# Patient Record
Sex: MALE | Race: ASIAN | NOT HISPANIC OR LATINO | URBAN - METROPOLITAN AREA
[De-identification: names, ages, dates, MRNs, and addresses within clinical notes are randomized per-mention and may not be internally consistent; named-entity substitution may affect disease eponyms.]

---

## 2020-07-17 ENCOUNTER — EMERGENCY (EMERGENCY)
Facility: HOSPITAL | Age: 65
LOS: 1 days | Discharge: HOME | End: 2020-07-17
Attending: EMERGENCY MEDICINE
Payer: MEDICAID

## 2020-07-17 VITALS
SYSTOLIC BLOOD PRESSURE: 118 MMHG | RESPIRATION RATE: 18 BRPM | HEART RATE: 70 BPM | TEMPERATURE: 99 F | DIASTOLIC BLOOD PRESSURE: 81 MMHG | OXYGEN SATURATION: 96 %

## 2020-07-17 PROCEDURE — 99285 EMERGENCY DEPT VISIT HI MDM: CPT

## 2020-07-17 PROCEDURE — 93010 ELECTROCARDIOGRAM REPORT: CPT

## 2020-07-17 NOTE — ED PROVIDER NOTE - NS ED ROS FT
CONSTITUTIONAL: UNSTEADINESS. No fevers, chills, fatigue, weakness  HEENT: No loss of vision, double vision, blurry vision, ear drainage, nasal congestion, runny nose, sore throat  CV: No chest pain, palpitations  PULM: No cough, shortness of breath  GI: NAUSEA. No abdominal pain, vomiting, bloody stools  : No dysuria, polyuria, hematuria  SKIN: No rashes, swelling  MSK: No muscle aches, joint aches  NEURO: no headache, paresthesias  PSYCHIATRIC: Denies suicidal, homicidal ideations. No auditory, visual, tactile hallucinations.

## 2020-07-17 NOTE — ED PROVIDER NOTE - PROGRESS NOTE DETAILS
Rj Fortune D.O., PGY2 (Resident)  Patient feels back at baseline. Able to ambulate without complication. Discussed case with neurology who will get back to me. attending Alexis: pt evaluated by neurology. Offered admission by neurology but patient and family wish to follow-up with their private neurologist as already scheduled. Gait at baseline as per daughter. Will dc with copy of results, instruction for close outpatient follow-up with neurology and strict return precautions.

## 2020-07-17 NOTE — ED PROVIDER NOTE - ATTENDING CONTRIBUTION TO CARE
attending Alexis: 64yM h/o gout, HTN, "mild CAD" presents with daughter from home for dizziness. As per daughter, pt with subacute dizziness x months with ongoing outpatient workup (pending MRI brain next week) and possible diagnosis of Parkinson disease. Today with episode of nausea and shaking of b/l hands, lasting 30 min with no LOC. +period of confusion following as per daughter, now back to baseline. No h/o seizures. Had difficulty walking today more than baseline. No falls. Neuro exam significant for tongue deviation to L and difficulty standing and walking independently. Will obtain FSG, ekg, place on tele, labs, CT imaging brain/neck, neuro eval in ED, reassess.

## 2020-07-17 NOTE — ED PROVIDER NOTE - CLINICAL SUMMARY MEDICAL DECISION MAKING FREE TEXT BOX
64 y.o. male here for acute on chronic dizziness ongoing for past 3 months, different today as there was an episode of shaking, self-limited as well as nausea. Vitals wnl. Exam with left tongue deviation but no other focal neuro deficits. Patient unable to ambulate. Became dizzy upon standing. Will eval for acute intracranial process vs seizure focus vs infectious etiology. Will get labs including trop, CTA head and neck, CTH noncontrast, and reassess. Will likely discuss case with neuro pending CTs.

## 2020-07-17 NOTE — ED PROVIDER NOTE - PATIENT PORTAL LINK FT
You can access the FollowMyHealth Patient Portal offered by [facility] by registering at the following website: [email or web address]. By joining [facility]’s FollowMyHealth portal, you will also be able to view your health information using other applications (apps) compatible with our system.

## 2020-07-17 NOTE — ED PROVIDER NOTE - NSFOLLOWUPINSTRUCTIONS_ED_ALL_ED_FT
Stay well hydrated.  Follow-up with your neurologist within 1-2 days  Bring a copy of your results with you  Return to an ER for worsening symptoms or any other concerns.

## 2020-07-17 NOTE — ED PROVIDER NOTE - PHYSICAL EXAMINATION
GENERAL: elderly male, lying in bed, NAD. Vital signs are within normal limits  HEENT: NC/AT, conjunctiva noninjected and sclera anicteric, moist mucous membranes,  NECK: Supple, trachea midline  LUNG: CTAB, no w/r/r appreciated  CV: RRR, no m/r/g appreciated, Pulses- Radial: 2+ b/l; posterior tibial: 2+ b/l; dorsalis pedis: 2+ b/l  ABDOMEN: Soft, NTND, no rebound or guarding  BACK: No midline spinal tenderness or step-offs. No paraspinal tenderness to palpation  MSK: No edema, no visible deformities, full range of motion UE/LE b/l, 5/5 muscle strength UE/LE b/l, nontender extremities  NEURO: AAOx4 (to person, place, time, event), sensation grossly intact, finger-to-nose smooth and rapid, no pronator drift, unsteady gait  -Cranial Nerves:  --CN II: PERRL  --CN III, IV, VI: EOMI b/l  --CN V1-3: Facial sensation intact to touch  --CN VII: No facial asymmetry or droop  --CN VIII: Hearing intact to rubbing fingers b/l  --CN IX, X: Palate elevates symmetrically. Normal phonation  --CN XI: Heading turning and shoulder shrug intact b/l  --CN XII: Tongue deviates to left but with normal movements   SKIN: Warm, dry, well perfused, no evidence of rash  PSYCH: Normal mood and affect

## 2020-07-18 VITALS
DIASTOLIC BLOOD PRESSURE: 70 MMHG | OXYGEN SATURATION: 95 % | RESPIRATION RATE: 16 BRPM | TEMPERATURE: 98 F | SYSTOLIC BLOOD PRESSURE: 110 MMHG | HEART RATE: 63 BPM

## 2020-07-18 LAB
ALBUMIN SERPL ELPH-MCNC: 4.6 G/DL — SIGNIFICANT CHANGE UP (ref 3.3–5)
ALP SERPL-CCNC: 95 U/L — SIGNIFICANT CHANGE UP (ref 40–120)
ALT FLD-CCNC: 57 U/L — HIGH (ref 10–45)
ANION GAP SERPL CALC-SCNC: 15 MMOL/L — SIGNIFICANT CHANGE UP (ref 5–17)
APTT BLD: 33.2 SEC — SIGNIFICANT CHANGE UP (ref 27.5–35.5)
AST SERPL-CCNC: 39 U/L — SIGNIFICANT CHANGE UP (ref 10–40)
BASE EXCESS BLDV CALC-SCNC: -1.2 MMOL/L — SIGNIFICANT CHANGE UP (ref -2–2)
BASOPHILS # BLD AUTO: 0.03 K/UL — SIGNIFICANT CHANGE UP (ref 0–0.2)
BASOPHILS NFR BLD AUTO: 0.3 % — SIGNIFICANT CHANGE UP (ref 0–2)
BILIRUB SERPL-MCNC: 0.8 MG/DL — SIGNIFICANT CHANGE UP (ref 0.2–1.2)
BUN SERPL-MCNC: 11 MG/DL — SIGNIFICANT CHANGE UP (ref 7–23)
CA-I SERPL-SCNC: 1.24 MMOL/L — SIGNIFICANT CHANGE UP (ref 1.12–1.3)
CALCIUM SERPL-MCNC: 10 MG/DL — SIGNIFICANT CHANGE UP (ref 8.4–10.5)
CHLORIDE BLDV-SCNC: 109 MMOL/L — HIGH (ref 96–108)
CHLORIDE SERPL-SCNC: 105 MMOL/L — SIGNIFICANT CHANGE UP (ref 96–108)
CK SERPL-CCNC: 61 U/L — SIGNIFICANT CHANGE UP (ref 30–200)
CO2 BLDV-SCNC: 27 MMOL/L — SIGNIFICANT CHANGE UP (ref 22–30)
CO2 SERPL-SCNC: 20 MMOL/L — LOW (ref 22–31)
CREAT SERPL-MCNC: 0.92 MG/DL — SIGNIFICANT CHANGE UP (ref 0.5–1.3)
EOSINOPHIL # BLD AUTO: 0.14 K/UL — SIGNIFICANT CHANGE UP (ref 0–0.5)
EOSINOPHIL NFR BLD AUTO: 1.3 % — SIGNIFICANT CHANGE UP (ref 0–6)
GAS PNL BLDV: 141 MMOL/L — SIGNIFICANT CHANGE UP (ref 135–145)
GAS PNL BLDV: SIGNIFICANT CHANGE UP
GAS PNL BLDV: SIGNIFICANT CHANGE UP
GLUCOSE BLDV-MCNC: 120 MG/DL — HIGH (ref 70–99)
GLUCOSE SERPL-MCNC: 129 MG/DL — HIGH (ref 70–99)
HCO3 BLDV-SCNC: 25 MMOL/L — SIGNIFICANT CHANGE UP (ref 21–29)
HCT VFR BLD CALC: 41.7 % — SIGNIFICANT CHANGE UP (ref 39–50)
HCT VFR BLDA CALC: 46 % — SIGNIFICANT CHANGE UP (ref 39–50)
HGB BLD CALC-MCNC: 15 G/DL — SIGNIFICANT CHANGE UP (ref 13–17)
HGB BLD-MCNC: 14 G/DL — SIGNIFICANT CHANGE UP (ref 13–17)
HOROWITZ INDEX BLDV+IHG-RTO: SIGNIFICANT CHANGE UP
IMM GRANULOCYTES NFR BLD AUTO: 0.4 % — SIGNIFICANT CHANGE UP (ref 0–1.5)
INR BLD: 1.03 RATIO — SIGNIFICANT CHANGE UP (ref 0.88–1.16)
LACTATE BLDV-MCNC: 1.9 MMOL/L — SIGNIFICANT CHANGE UP (ref 0.7–2)
LYMPHOCYTES # BLD AUTO: 29.1 % — SIGNIFICANT CHANGE UP (ref 13–44)
LYMPHOCYTES # BLD AUTO: 3.06 K/UL — SIGNIFICANT CHANGE UP (ref 1–3.3)
MCHC RBC-ENTMCNC: 32.2 PG — SIGNIFICANT CHANGE UP (ref 27–34)
MCHC RBC-ENTMCNC: 33.6 GM/DL — SIGNIFICANT CHANGE UP (ref 32–36)
MCV RBC AUTO: 95.9 FL — SIGNIFICANT CHANGE UP (ref 80–100)
MONOCYTES # BLD AUTO: 1.49 K/UL — HIGH (ref 0–0.9)
MONOCYTES NFR BLD AUTO: 14.2 % — HIGH (ref 2–14)
NEUTROPHILS # BLD AUTO: 5.76 K/UL — SIGNIFICANT CHANGE UP (ref 1.8–7.4)
NEUTROPHILS NFR BLD AUTO: 54.7 % — SIGNIFICANT CHANGE UP (ref 43–77)
NRBC # BLD: 0 /100 WBCS — SIGNIFICANT CHANGE UP (ref 0–0)
PCO2 BLDV: 50 MMHG — SIGNIFICANT CHANGE UP (ref 35–50)
PH BLDV: 7.32 — LOW (ref 7.35–7.45)
PLATELET # BLD AUTO: 275 K/UL — SIGNIFICANT CHANGE UP (ref 150–400)
PO2 BLDV: 29 MMHG — SIGNIFICANT CHANGE UP (ref 25–45)
POTASSIUM BLDV-SCNC: 4.4 MMOL/L — SIGNIFICANT CHANGE UP (ref 3.5–5.3)
POTASSIUM SERPL-MCNC: 3.9 MMOL/L — SIGNIFICANT CHANGE UP (ref 3.5–5.3)
POTASSIUM SERPL-SCNC: 3.9 MMOL/L — SIGNIFICANT CHANGE UP (ref 3.5–5.3)
PROT SERPL-MCNC: 7.9 G/DL — SIGNIFICANT CHANGE UP (ref 6–8.3)
PROTHROM AB SERPL-ACNC: 12.2 SEC — SIGNIFICANT CHANGE UP (ref 10.6–13.6)
RBC # BLD: 4.35 M/UL — SIGNIFICANT CHANGE UP (ref 4.2–5.8)
RBC # FLD: 12 % — SIGNIFICANT CHANGE UP (ref 10.3–14.5)
SAO2 % BLDV: 46 % — LOW (ref 67–88)
SODIUM SERPL-SCNC: 140 MMOL/L — SIGNIFICANT CHANGE UP (ref 135–145)
TROPONIN T, HIGH SENSITIVITY RESULT: <6 NG/L — SIGNIFICANT CHANGE UP (ref 0–51)
WBC # BLD: 10.52 K/UL — HIGH (ref 3.8–10.5)
WBC # FLD AUTO: 10.52 K/UL — HIGH (ref 3.8–10.5)

## 2020-07-18 PROCEDURE — 85730 THROMBOPLASTIN TIME PARTIAL: CPT

## 2020-07-18 PROCEDURE — 70498 CT ANGIOGRAPHY NECK: CPT | Mod: 26

## 2020-07-18 PROCEDURE — 82803 BLOOD GASES ANY COMBINATION: CPT

## 2020-07-18 PROCEDURE — 84132 ASSAY OF SERUM POTASSIUM: CPT

## 2020-07-18 PROCEDURE — 70498 CT ANGIOGRAPHY NECK: CPT

## 2020-07-18 PROCEDURE — 70496 CT ANGIOGRAPHY HEAD: CPT | Mod: 26

## 2020-07-18 PROCEDURE — 84484 ASSAY OF TROPONIN QUANT: CPT

## 2020-07-18 PROCEDURE — 84295 ASSAY OF SERUM SODIUM: CPT

## 2020-07-18 PROCEDURE — 82947 ASSAY GLUCOSE BLOOD QUANT: CPT

## 2020-07-18 PROCEDURE — 82435 ASSAY OF BLOOD CHLORIDE: CPT

## 2020-07-18 PROCEDURE — 82330 ASSAY OF CALCIUM: CPT

## 2020-07-18 PROCEDURE — 85027 COMPLETE CBC AUTOMATED: CPT

## 2020-07-18 PROCEDURE — 82550 ASSAY OF CK (CPK): CPT

## 2020-07-18 PROCEDURE — 80053 COMPREHEN METABOLIC PANEL: CPT

## 2020-07-18 PROCEDURE — 70450 CT HEAD/BRAIN W/O DYE: CPT

## 2020-07-18 PROCEDURE — 93005 ELECTROCARDIOGRAM TRACING: CPT

## 2020-07-18 PROCEDURE — 82962 GLUCOSE BLOOD TEST: CPT

## 2020-07-18 PROCEDURE — 83605 ASSAY OF LACTIC ACID: CPT

## 2020-07-18 PROCEDURE — 70496 CT ANGIOGRAPHY HEAD: CPT

## 2020-07-18 PROCEDURE — 85610 PROTHROMBIN TIME: CPT

## 2020-07-18 PROCEDURE — 85014 HEMATOCRIT: CPT

## 2020-07-18 PROCEDURE — 99284 EMERGENCY DEPT VISIT MOD MDM: CPT | Mod: 25

## 2020-07-18 NOTE — ED ADULT NURSE NOTE - OBJECTIVE STATEMENT
64y old male PMH Gout, HTN walk in from triage c/o dizziness. A&Ox3.  As per patients daughter who witnessed the events patient around 5pm took medication for gout and then felt nauseous, at 7pm patient hands on both sides began shaking for about 30 minutes, patient mental status was then delayed as per patients daughter and his gait was shuffled, patient felt like he was going to fall and slowly went down to floor. Patient denies chest pain, sob, ha, n/v/d, abdominal pain, f/c, urinary symptoms, hematuria, LOC, head injury, blurred vison, slurred speech, weakness, numbness/tingling. Patient is well appearing Patient is well appearing, skin warm and intact, PERRL, EOM intact, sensory intact, equal strength b/l in all upper and lower extremities, no drift present in all 4 extremities, gait is unsteady. Negative FAST exam. IV inserted, call bell with in reach, safety being maintained.

## 2020-07-18 NOTE — CONSULT NOTE ADULT - ASSESSMENT
Assessment: 64 year old RH M with a PMH of HTN and gout who presented with worsening dizziness over the past two to three days. Initial vital signs unremarkable. Physical exam nonfocal. CTH w/o showed no acute pathology. CTA H/N w/ showed no LVO. Labs significant for WBC of 10.52.    Impression: Dizziness of unclear etiology. Possibly cervicogenic and/or post-concussive. Lower suspicion for stroke.    Plan:  MRI brain w/o and MRI C-spine w/ and w/o  Meclizine prn  Outpatient neurology follow up with private neurologist    Case to be discussed with neurology attending, Dr. Alvarez

## 2020-07-18 NOTE — ED ADULT NURSE NOTE - CHPI ED NUR SYMPTOMS NEG
no weakness/no numbness/no vomiting/no loss of consciousness/no change in level of consciousness/no fever/no blurred vision

## 2020-07-18 NOTE — ED ADULT NURSE REASSESSMENT NOTE - NS ED NURSE REASSESS COMMENT FT1
Pt resting comfortably with  no complaints at this time. Will continue to monitor. Pending Neuro consult.

## 2020-07-18 NOTE — CONSULT NOTE ADULT - SUBJECTIVE AND OBJECTIVE BOX
HPI: 64 year old RH M with a PMH of HTN, gout, TBI (occurred many years ago, shattered mandible, fell from a significant height) who presented with acute on chronic dizziness with head tightness over the past two to three days. He has had associated nausea. The dizziness is not a spinning sensation, but is described as an unsteadiness. It usually occurs in the afternoon. He tried holding a bottle, but had bilateral hand shaking observed by his daughter. Patient's blood pressure has been elevated during this time period as well. Patient has been having dizziness over the past 2-3 months and saw a neurologist on 7/16 who recommended obtaining an MRI/DAI scan on 7/21. Patient's neurologist thought that the patient may have Parkinson's. Patient takes a medication from [place] for dizziness in the morning and believes that this may be why he is generally asymptomatic in the mornings. He denies vision changes, numbness, tingling, weakness, recent illness, or recent head trauma.     Huma interpretation provided by patient's daughter.      NIHSS: 0  MRS: 0    REVIEW OF SYSTEMS  A 10-system ROS was performed and is negative except for those items noted above and/or in the HPI.    PAST MEDICAL & SURGICAL HISTORY:  Gout  HTN (hypertension)  No significant past surgical history    FAMILY HISTORY:    SOCIAL HISTORY:   T/E/D: drinks alcohol daily (1-2 drinks), no tobacco use  Lives with: family    MEDICATIONS (HOME):  Home Medications:    MEDICATIONS  (STANDING):    MEDICATIONS  (PRN):    ALLERGIES/INTOLERANCES:  Allergies  No Known Allergies    Intolerances    VITALS & EXAMINATION:  Vital Signs Last 24 Hrs  T(C): 36.4 (18 Jul 2020 02:54), Max: 37 (17 Jul 2020 21:39)  T(F): 97.5 (18 Jul 2020 02:54), Max: 98.6 (17 Jul 2020 21:39)  HR: 63 (18 Jul 2020 02:54) (63 - 70)  BP: 110/70 (18 Jul 2020 02:54) (110/70 - 123/88)  BP(mean): 82 (18 Jul 2020 02:54) (82 - 82)  RR: 16 (18 Jul 2020 02:54) (16 - 18)  SpO2: 95% (18 Jul 2020 02:54) (95% - 97%)    General: Male, appears stated age, in no apparent distress including pain  Neck: supple, no TTP over spine    Neurological (>12):  MS: Awake, alert, oriented to person, place, situation, time. Normal affect. Follows all commands.    Language: Speech is clear, fluent with good repetition & comprehension (able to name objects)    CNs: PERRL (R = 3mm, L = 3mm). VFF. EOMI no nystagmus. V1-3 intact to LT. No facial asymmetry b/l. Hearing grossly normal (rubbing fingers) b/l. Symmetric palate elevation in midline. Gag reflex deferred. Head turning & shoulder shrug intact b/l. Tongue midline, normal movements, no atrophy.    Fundoscopic: deferred     Motor: Normal muscle bulk & tone. No noticeable tremor or seizure. No pronator drift.  No motor drift in the extremities.  Biceps/Triceps/ 5/5 b/l  Hip flexion 5/5 b/l  Dorsiflexion/plantarflexion 5/5 b/l     Sensation: Intact to LT b/l throughout.     Cortical: Extinction on DSS (neglect): none    Reflexes:              Biceps(C5)       BR(C6)     Triceps(C7)               Patellar(L4)    Achilles(S1)    Plantar Resp  R	2	          2	             2		        2		    2		Mute  L	2	          2	             2		        2		    2		Down     Coordination: No dysmetria to FTN/HTS    Gait: deferred     LABORATORY:  CBC                       14.0   10.52 )-----------( 275      ( 18 Jul 2020 00:26 )             41.7     Chem 07-18    140  |  105  |  11  ----------------------------<  129<H>  3.9   |  20<L>  |  0.92    Ca    10.0      18 Jul 2020 00:26    TPro  7.9  /  Alb  4.6  /  TBili  0.8  /  DBili  x   /  AST  39  /  ALT  57<H>  /  AlkPhos  95  07-18    LFTs LIVER FUNCTIONS - ( 18 Jul 2020 00:26 )  Alb: 4.6 g/dL / Pro: 7.9 g/dL / ALK PHOS: 95 U/L / ALT: 57 U/L / AST: 39 U/L / GGT: x           Coagulopathy PT/INR - ( 18 Jul 2020 00:26 )   PT: 12.2 sec;   INR: 1.03 ratio         PTT - ( 18 Jul 2020 00:26 )  PTT:33.2 sec  Lipid Panel   A1c   Cardiac enzymes CARDIAC MARKERS ( 18 Jul 2020 00:26 )  x     / x     / 61 U/L / x     / x          U/A   CSF  Immunological  Other    STUDIES & IMAGING:  Studies (EKG, EEG, EMG, etc):     Radiology (XR, CT, MR, U/S, TTE/MAYTE):  < from: CT Head No Cont (07.18.20 @ 02:02) >  IMPRESSION:  Noncontrast head CT:   No acute intracranial hemorrhage or mass effect vasogenic edema. Mild chronic microvascular changes.    CTA head and neck:   No major vessel occlusion, hemodynamically significant stenosis, dissectionor aneurysm.  Congenital variation in anatomy as above.    Evaluation of stenosis in the neck is in keeping with the NASCET criteria.    < end of copied text >